# Patient Record
Sex: FEMALE | Race: OTHER | HISPANIC OR LATINO | URBAN - METROPOLITAN AREA
[De-identification: names, ages, dates, MRNs, and addresses within clinical notes are randomized per-mention and may not be internally consistent; named-entity substitution may affect disease eponyms.]

---

## 2019-07-15 ENCOUNTER — EMERGENCY (EMERGENCY)
Facility: HOSPITAL | Age: 39
LOS: 0 days | Discharge: HOME | End: 2019-07-15
Attending: EMERGENCY MEDICINE | Admitting: EMERGENCY MEDICINE
Payer: COMMERCIAL

## 2019-07-15 VITALS
OXYGEN SATURATION: 100 % | DIASTOLIC BLOOD PRESSURE: 69 MMHG | SYSTOLIC BLOOD PRESSURE: 108 MMHG | TEMPERATURE: 98 F | RESPIRATION RATE: 18 BRPM | HEART RATE: 63 BPM

## 2019-07-15 VITALS — WEIGHT: 156.97 LBS | HEIGHT: 68 IN

## 2019-07-15 DIAGNOSIS — Z79.01 LONG TERM (CURRENT) USE OF ANTICOAGULANTS: ICD-10-CM

## 2019-07-15 DIAGNOSIS — Z86.718 PERSONAL HISTORY OF OTHER VENOUS THROMBOSIS AND EMBOLISM: ICD-10-CM

## 2019-07-15 DIAGNOSIS — M54.10 RADICULOPATHY, SITE UNSPECIFIED: ICD-10-CM

## 2019-07-15 DIAGNOSIS — R07.9 CHEST PAIN, UNSPECIFIED: ICD-10-CM

## 2019-07-15 LAB
ALBUMIN SERPL ELPH-MCNC: 3.5 G/DL — SIGNIFICANT CHANGE UP (ref 3.3–5)
ALP SERPL-CCNC: 75 U/L — SIGNIFICANT CHANGE UP (ref 40–120)
ALT FLD-CCNC: 16 U/L — SIGNIFICANT CHANGE UP (ref 12–78)
ANION GAP SERPL CALC-SCNC: 8 MMOL/L — SIGNIFICANT CHANGE UP (ref 5–17)
APTT BLD: 30.6 SEC — SIGNIFICANT CHANGE UP (ref 27.5–36.3)
AST SERPL-CCNC: 9 U/L — LOW (ref 15–37)
BILIRUB SERPL-MCNC: 0.3 MG/DL — SIGNIFICANT CHANGE UP (ref 0.2–1.2)
BUN SERPL-MCNC: 9 MG/DL — SIGNIFICANT CHANGE UP (ref 7–23)
CALCIUM SERPL-MCNC: 8.8 MG/DL — SIGNIFICANT CHANGE UP (ref 8.5–10.1)
CHLORIDE SERPL-SCNC: 105 MMOL/L — SIGNIFICANT CHANGE UP (ref 96–108)
CO2 SERPL-SCNC: 27 MMOL/L — SIGNIFICANT CHANGE UP (ref 22–31)
CREAT SERPL-MCNC: 0.73 MG/DL — SIGNIFICANT CHANGE UP (ref 0.5–1.3)
D DIMER BLD IA.RAPID-MCNC: 227 NG/ML DDU — SIGNIFICANT CHANGE UP
GLUCOSE SERPL-MCNC: 72 MG/DL — SIGNIFICANT CHANGE UP (ref 70–99)
HCT VFR BLD CALC: 35.6 % — SIGNIFICANT CHANGE UP (ref 34.5–45)
HGB BLD-MCNC: 11.7 G/DL — SIGNIFICANT CHANGE UP (ref 11.5–15.5)
INR BLD: 1.08 RATIO — SIGNIFICANT CHANGE UP (ref 0.88–1.16)
MCHC RBC-ENTMCNC: 27.3 PG — SIGNIFICANT CHANGE UP (ref 27–34)
MCHC RBC-ENTMCNC: 32.9 GM/DL — SIGNIFICANT CHANGE UP (ref 32–36)
MCV RBC AUTO: 83 FL — SIGNIFICANT CHANGE UP (ref 80–100)
PLATELET # BLD AUTO: 354 K/UL — SIGNIFICANT CHANGE UP (ref 150–400)
POTASSIUM SERPL-MCNC: 3.6 MMOL/L — SIGNIFICANT CHANGE UP (ref 3.5–5.3)
POTASSIUM SERPL-SCNC: 3.6 MMOL/L — SIGNIFICANT CHANGE UP (ref 3.5–5.3)
PROT SERPL-MCNC: 7.8 GM/DL — SIGNIFICANT CHANGE UP (ref 6–8.3)
PROTHROM AB SERPL-ACNC: 12 SEC — SIGNIFICANT CHANGE UP (ref 10–12.9)
RBC # BLD: 4.29 M/UL — SIGNIFICANT CHANGE UP (ref 3.8–5.2)
RBC # FLD: 12.4 % — SIGNIFICANT CHANGE UP (ref 10.3–14.5)
SODIUM SERPL-SCNC: 140 MMOL/L — SIGNIFICANT CHANGE UP (ref 135–145)
TROPONIN I SERPL-MCNC: <0.015 NG/ML — SIGNIFICANT CHANGE UP (ref 0.01–0.04)
WBC # BLD: 3.03 K/UL — LOW (ref 3.8–10.5)
WBC # FLD AUTO: 3.03 K/UL — LOW (ref 3.8–10.5)

## 2019-07-15 PROCEDURE — 71275 CT ANGIOGRAPHY CHEST: CPT | Mod: 26

## 2019-07-15 PROCEDURE — 93010 ELECTROCARDIOGRAM REPORT: CPT

## 2019-07-15 PROCEDURE — 99284 EMERGENCY DEPT VISIT MOD MDM: CPT

## 2019-07-15 RX ORDER — SODIUM CHLORIDE 9 MG/ML
3 INJECTION INTRAMUSCULAR; INTRAVENOUS; SUBCUTANEOUS ONCE
Refills: 0 | Status: COMPLETED | OUTPATIENT
Start: 2019-07-15 | End: 2019-07-15

## 2019-07-15 RX ORDER — ACETAMINOPHEN 500 MG
650 TABLET ORAL ONCE
Refills: 0 | Status: COMPLETED | OUTPATIENT
Start: 2019-07-15 | End: 2019-07-15

## 2019-07-15 RX ORDER — KETOROLAC TROMETHAMINE 30 MG/ML
15 SYRINGE (ML) INJECTION ONCE
Refills: 0 | Status: DISCONTINUED | OUTPATIENT
Start: 2019-07-15 | End: 2019-07-15

## 2019-07-15 RX ADMIN — Medication 650 MILLIGRAM(S): at 15:52

## 2019-07-15 RX ADMIN — SODIUM CHLORIDE 3 MILLILITER(S): 9 INJECTION INTRAMUSCULAR; INTRAVENOUS; SUBCUTANEOUS at 13:36

## 2019-07-15 NOTE — ED PROVIDER NOTE - NSFOLLOWUPINSTRUCTIONS_ED_ALL_ED_FT
Activity as tolerated  Follow-up with your regular physician and/or a spine specialist. See Dr. Cho's information above.  Return with any persistent/worsening symptoms.

## 2019-07-15 NOTE — ED PROVIDER NOTE - CARDIAC, MLM
Normal rate, regular rhythm.  Heart sounds S1, S2.  No murmurs, rubs or gallops. +reproducible R sided CP with deep inspiration and palpation

## 2019-07-15 NOTE — ED PROVIDER NOTE - MUSCULOSKELETAL, MLM
Spine appears normal, range of motion is not limited, no muscle or joint tenderness. RLE no swelling, normal strength.

## 2019-07-15 NOTE — ED PROVIDER NOTE - PROGRESS NOTE DETAILS
CTA, trop all negative.  Pain seems very radicular (neck->chest and right upper ext). Worse with movement.  Refused NSAIDs.  No suggestion of cardiac or pulmonary process.  Will refer spine for ?radiculopathy.

## 2019-07-15 NOTE — ED PROVIDER NOTE - OBJECTIVE STATEMENT
40 y/o F with a PMHx of DVT during pregnancy, high platelets presents to the ED c/o CP x4 days. States pain radiates to her right arm, is unable to lift or carry anything in her right arm. +intermittent diaphoresis and SOB. Pt saw MD Campbell today, had an EKG with t inversion in f and v3, v4 and flattening in v5 and v6. No ST elevations. MD sent pt to [facility] to r/o pericarditis vs PE vs ACS. States the pain has been constant and worse when breathing, lying down and on exertion. Denies tobacco use, leg swelling, cough, or fever. NKDA. PMD: Dr. Rodriguez.

## 2019-07-15 NOTE — ED PROVIDER NOTE - CARE PROVIDER_API CALL
Narciso Cho (DO)  Orthopaedic Surgery  [address] [place] [address], [address] [place] [address]  Phone: [phone]  Fax: [phone]  Follow Up Time:

## 2019-07-15 NOTE — ED ADULT NURSE NOTE - NSIMPLEMENTINTERV_GEN_ALL_ED
Implemented All Universal Safety Interventions:  [place] to call system. Call bell, personal items and telephone within reach. Instruct patient to call for assistance. Room bathroom lighting operational. Non-slip footwear when patient is off stretcher. Physically safe environment: no spills, clutter or unnecessary equipment. Stretcher in lowest position, wheels locked, appropriate side rails in place.

## 2019-07-19 ENCOUNTER — APPOINTMENT (OUTPATIENT)
Dept: NEUROSURGERY | Facility: CLINIC | Age: 39
End: 2019-07-19

## 2019-08-03 PROBLEM — O22.30 DEEP PHLEBOTHROMBOSIS IN PREGNANCY, UNSPECIFIED TRIMESTER: Chronic | Status: ACTIVE | Noted: 2019-07-15

## 2019-08-06 ENCOUNTER — APPOINTMENT (OUTPATIENT)
Dept: NEUROLOGY | Facility: CLINIC | Age: 39
End: 2019-08-06
Payer: COMMERCIAL

## 2019-08-06 VITALS
SYSTOLIC BLOOD PRESSURE: 102 MMHG | BODY MASS INDEX: 24.25 KG/M2 | HEART RATE: 94 BPM | HEIGHT: 68 IN | WEIGHT: 160 LBS | DIASTOLIC BLOOD PRESSURE: 66 MMHG

## 2019-08-06 DIAGNOSIS — R56.9 UNSPECIFIED CONVULSIONS: ICD-10-CM

## 2019-08-06 DIAGNOSIS — Z80.9 FAMILY HISTORY OF MALIGNANT NEOPLASM, UNSPECIFIED: ICD-10-CM

## 2019-08-06 DIAGNOSIS — G43.709 CHRONIC MIGRAINE W/OUT AURA, NOT INTRACTABLE, W/OUT STATUS MIGRAINOSUS: ICD-10-CM

## 2019-08-06 DIAGNOSIS — Z78.9 OTHER SPECIFIED HEALTH STATUS: ICD-10-CM

## 2019-08-06 DIAGNOSIS — Z86.718 PERSONAL HISTORY OF OTHER VENOUS THROMBOSIS AND EMBOLISM: ICD-10-CM

## 2019-08-06 DIAGNOSIS — D47.3 ESSENTIAL (HEMORRHAGIC) THROMBOCYTHEMIA: ICD-10-CM

## 2019-08-06 PROCEDURE — 99244 OFF/OP CNSLTJ NEW/EST MOD 40: CPT

## 2019-08-06 NOTE — CONSULT LETTER
[Dear  ___] : Dear  [unfilled], [Consult Letter:] : I had the pleasure of evaluating your patient, [unfilled]. [Consult Closing:] : Thank you very much for allowing me to participate in the care of this patient.  If you have any questions, please do not hesitate to contact me. [Please see my note below.] : Please see my note below. [FreeTextEntry2] : Nathaniel Mancuso [FreeTextEntry3] : Sincerely,\par \par \par Roxana Tucker MD\par Diplomate, American Academy of Psychiatry and Neurology\par Board Certified in the Subspecialty of Clinical Neurophysiology\par Board Certified in the Subspecialty of Sleep Medicine\par Board Certified in the Subspecialty of Epilepsy\par  [DrGene  ___] : Dr. TOLEDO [DrGene ___] : Dr. TOLEDO

## 2019-08-06 NOTE — DATA REVIEWED
[de-identified] : MRi brain with and without contrast 7/23/19: 1. NO acute infarct or acute intracranial disease\par 2. 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 Although much les likely, hemorrhagic neoplasm may present with similar characteristics.  [de-identified] : CT brain 7/22/19: 1.3 cm poorly defined hypodense region in the left occipital lobe. May represent hemorrhagic contusion, AVM subarachnoid hemorrhage or intracranial mass. \par CTA brain 7/22/19: Negative\par CTA neck 7/22/19: negative\par \par CT chest/abd/pelvis without contrast 7/22/19: unremarkable. No evidence of acute traumatic injury.

## 2019-08-06 NOTE — HISTORY OF PRESENT ILLNESS
[FreeTextEntry1] : On 7/22/19 she was having a breast biopsy. Immediately after the biopsy she was having a mammogram. She has had several previous biopsies. She was not anxious. She does not recall having any preceding symptoms. The last thing she recalls she was being set up for an image and the next thing she knew there was screaming and then she was being brought into the emergency room at [facility].\par She is not sure how long she was unconscious for. She was told by one of the nurses that while on the floor she was shaking. There was no urinary incontinence or tongue bite. She regained consciousness in the mammogram suite and was surrounded by several people. However, she thinks she lost consciousness again because the next thing she knew she was in the ER. She does recall being awake but not being able to speak. \par She thinks that an hour elapsed from the time when she first passed out in the mammo suite until she was back to her baseline. \par \par She was given local anesthesia for the biopsy but no other medications. 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 \par \par She had CT head/CTA head/CTA neck and MRI brain. She was told that she had a cavernoma.\par \par The previous week she was sent to the hospital by Dr. Abebe for an abnormal EKG. \par She was seen in the ED at [facility] with chest pain (radiating to the right arm) and shortness of breath. She also had an abnormal EEG earlier in the day at her doctor's office. She had a CT angio of the chest to rule out pulmonary embolism. It was negative. \par \par She has no prior episodes of loss of consciousness in the past.\par However, she did once wake up from surgery and was able to hear and see but was unable to speak. This occurred after a surgery in 2016. She was unable to move any of her extremities. She believes that there was at least a few hours between her waking up and being able to move and speak.\par \par She has no previous history of seizures.\par She was born full term. There were no complications with pregnancy and no history of developmental delay.\par There was no history of febrile seizures. \par She has no history of head trauma or CNS infections.\par She has a first cousin with seizures.\par She has not woken up with tongue bites, incontinence or had lapses in time that she cannot account for.\par She has no olfactory hallucinations, rising epigastric sensation or stereotypical nadine vu. \par \par She has been told that she has excessive platelets.\par She has a history of having blood clots during pregnancy.\par \par She started seeing Dr. Cho for her right arm pain and weakness. \par \par Her cardiology workup was put on hold but she is seeing  tomorrow and is supposed to have a Holter monitor. \par \par Her  has noticed mild snoring at night but no pauses in her breathing.

## 2019-08-06 NOTE — REVIEW OF SYSTEMS
[As Noted in HPI] : as noted in HPI [Palpitations] : palpitations [Chest Pain] : chest pain [Shortness Of Breath] : shortness of breath [Arthralgias] : arthralgias [Joint Pain] : joint pain [Negative] : Genitourinary [FreeTextEntry2] : night sweats [de-identified] : headache, double vision, difficulty speaking, weakness [FreeTextEntry5] : syncope

## 2019-08-06 NOTE — PHYSICAL EXAM
[FreeTextEntry1] : Examination:\par Constitutional: normal, no apparent distress\par oropharynx: narrow\par Eyes: normal conjunctiva b/l, no ptosis, visual fields full\par Respiratory: no respiratory distress, normal effort, normal auscultation\par Cardiovascular: normal rate, rhythm, no murmurs\par Neck: supple, no masses\par Vascular: carotids normal\par Skin: normal color, no rashes\par Psych: normal mood, affect\par \par Neurological:\par Memory: normal memory, oriented to person, place, time\par Language intact/no aphasia\par Cranial Nerves: II-XII intact, Pupils equally round and reactive to light, ocular muscles/movements intact, no ptosis, no facial weakness, tongue protrudes normally in the midline, \par Motor: normal tone, no pronator drift, full strength in all four extremities in the proximal and distal muscle groups\par Coordination: Fine motor movements intact, rapid alternating movements intact, finger to nose intact bilaterally\par Sensory: intact to light touch, vibration, joint position sense, negative Romberg examination\par DTRs: symmetric, 2+ in b/l triceps, 2+ in b/l biceps, 2+ in b/l brachioradialis, 2+ in bilateral patellars, 2+ in bilateral Achilles, Babinskis negative bilaterally\par Gait: narrow based, steady, able to walk on heels, toes, tandem gait\par \par

## 2019-08-06 NOTE — DISCUSSION/SUMMARY
[FreeTextEntry1] : Ms. Gaming is a 39 year old woman with a history of thrombocytosis, DVT and palpitations who had an episode of loss of consciousness during a mammogram which took place after a breast biopsy.\par The exact details of the event are unclear but I will try to contact the radiology department at [facility] to speak to Camilla, the mammogram technician.\par However, it does seem that the event was somewhat prolonged for simple syncope.\par A 24 hour EEG will be performed to evaluate for risk for seizures/epilepsy. I am holding off on any anticonvulsants for now. \par Agree with repeat MRI brain which is scheduled for the end of the month. \par Follow up with cardiology to include an event monitor. \par She should avoid driving at this time.\par I will follow up with her after her EEG, sooner if needed.

## 2019-08-20 ENCOUNTER — APPOINTMENT (OUTPATIENT)
Dept: NEUROLOGY | Facility: CLINIC | Age: 39
End: 2019-08-20
Payer: COMMERCIAL

## 2019-08-20 PROCEDURE — 95816 EEG AWAKE AND DROWSY: CPT

## 2019-08-21 ENCOUNTER — APPOINTMENT (OUTPATIENT)
Dept: NEUROLOGY | Facility: CLINIC | Age: 39
End: 2019-08-21
Payer: COMMERCIAL

## 2019-08-21 PROCEDURE — 95953: CPT

## 2019-08-21 PROCEDURE — 95957 EEG DIGITAL ANALYSIS: CPT

## 2019-09-18 ENCOUNTER — APPOINTMENT (OUTPATIENT)
Dept: NEUROLOGY | Facility: CLINIC | Age: 39
End: 2019-09-18
Payer: COMMERCIAL

## 2019-09-18 VITALS
HEIGHT: 68 IN | DIASTOLIC BLOOD PRESSURE: 77 MMHG | HEART RATE: 75 BPM | SYSTOLIC BLOOD PRESSURE: 116 MMHG | WEIGHT: 163 LBS | BODY MASS INDEX: 24.71 KG/M2

## 2019-09-18 DIAGNOSIS — R55 SYNCOPE AND COLLAPSE: ICD-10-CM

## 2019-09-18 DIAGNOSIS — M79.2 NEURALGIA AND NEURITIS, UNSPECIFIED: ICD-10-CM

## 2019-09-18 PROCEDURE — 99213 OFFICE O/P EST LOW 20 MIN: CPT

## 2019-09-18 NOTE — DATA REVIEWED
[de-identified] : MRi brain with and without contrast 7/23/19: 1. NO acute infarct or acute intracranial disease\par 2. 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 Although much les likely, hemorrhagic neoplasm may present with similar characteristics.  [de-identified] : EEG 8/20/19: Normal\par 24 hour ambulatory EEG 8/20/19-8/21/19: normal\par  [de-identified] : CT brain 7/22/19: 1.3 cm poorly defined hypodense region in the left occipital lobe. May represent hemorrhagic contusion, AVM subarachnoid hemorrhage or intracranial mass. \par CTA brain 7/22/19: Negative\par CTA neck 7/22/19: negative\par \par CT chest/abd/pelvis without contrast 7/22/19: unremarkable. No evidence of acute traumatic injury. \par \par MRI cervical spine on contrast 7/19/19: \par Reversal of normal cervical lordosis\par C4-C5: Disc osteophyte complex and bilateral uncovertebral hypertrophy and mild compression of ventral cord resulting in mild spinal canal stenosis. No cord signal abnormality. No significant foraminal narrowing. \par C5-6: Disc osteophyte complex and bilateral uncovertebral hypertrophy flattening the ventral cord and resulting in mild spinal canal stenosis. No cord signal abnormality. Small right foraminal disc herniation indenting the exiting right C6 nerve root.\par \par CT head 8/16/19:\par 12 mm hyperdense lesion in left occipital lobe. This could correspond to reported cavernoma.

## 2019-09-18 NOTE — CONSULT LETTER
[Dear  ___] : Dear  [unfilled], [Consult Letter:] : I had the pleasure of evaluating your patient, [unfilled]. [Please see my note below.] : Please see my note below. [FreeTextEntry2] : Nathaniel Mancuso [Consult Closing:] : Thank you very much for allowing me to participate in the care of this patient.  If you have any questions, please do not hesitate to contact me. [FreeTextEntry3] : Sincerely,\par \par \par Roxana Tucker MD\par Diplomate, American Academy of Psychiatry and Neurology\par Board Certified in the Subspecialty of Clinical Neurophysiology\par Board Certified in the Subspecialty of Sleep Medicine\par Board Certified in the Subspecialty of Epilepsy\par

## 2019-09-18 NOTE — DISCUSSION/SUMMARY
[FreeTextEntry1] : Ms. Gaming is a 39 year old woman with a history of thrombocytosis, DVT and palpitations who had an episode of loss of consciousness during a mammogram which took place after a breast biopsy.\par \par -Episode of loss of consciousness - Normal routine EEG and 24 hour ambulatory EEG.\par Reportedly + tilt table test. \par These findings make syncope less likely. \par Increase PO salt intake\par Normotensive today.\par Will not start any anticonvulsants at this time.\par \par -Right arm pain - ? radiculopathy. MRI shows indentation of right C6 nerve root.\par Will get EMG to evaluate for radiculopathy vs mononeuropathy.\par \par Follow-up after EMG.\par

## 2019-09-18 NOTE — PHYSICAL EXAM
[FreeTextEntry1] : Examination:\par Constitutional: normal, no apparent distress\par Eyes: normal conjunctiva b/l, no ptosis, visual fields full\par Respiratory: no respiratory distress, normal effort, normal auscultation\par Cardiovascular: normal rate, rhythm, no murmurs\par Neck: supple, no masses\par Vascular: carotids normal\par Skin: normal color, no rashes\par Psych: normal mood, affect\par \par Neurological:\par Memory: normal memory, oriented to person, place, time\par Language intact/no aphasia\par Cranial Nerves: II-XII intact, Pupils equally round and reactive to light, ocular muscles/movements intact, no ptosis, no facial weakness, tongue protrudes normally in the midline, \par Motor: normal tone, no pronator drift, full strength in all four extremities in the proximal and distal muscle groups\par Coordination: Fine motor movements intact, rapid alternating movements intact, finger to nose intact bilaterally\par Sensory: intact to light touch\par DTRs: symmetric, 1/4 in b/l triceps, 2/4 in b/l biceps, radialis, 2/4 in b/l patellars. negative Yost's bilaterally\par Gait: narrow based, steady\par

## 2019-09-18 NOTE — HISTORY OF PRESENT ILLNESS
[FreeTextEntry1] : I last saw Ms. Gaming on 8/6/19.\par She has not had any episodes of loss of consciousness since her last visit.\par  She denies having any seizures since the last visit. There have been no episodes of waking up with tongue bites, urinary incontinence, or unexplained muscle soreness. There have been no staring spells, lapses in time, myoclonus, episodes of intense nadine vu or rising, olfactory hallucinations or epigastric sensations.\par \par She reports that she "failed" her tilt table test within 5 minutes and had a SBP in the high 70s. She was told to increase salt intake. \par \par She was referred to a hand surgeon by Dr. Mancuso. She saw Dr. Desai. he would like her to get a nerve conduction study.\par She has constant pain in her right arm from the mid upper arm to the mid forearm. Her activity is limited due to the pain. She cannot lift a light object without having pain.\par She has occasional numbness/tingling in all five fingers.\par She is woken up from sleep from the pain.\par She does not have neck pain.\par She describes the pain as sharp. It has been present for about 10 months.\par She has had MRI of her cervical spine.

## 2019-10-23 ENCOUNTER — APPOINTMENT (OUTPATIENT)
Dept: NEUROLOGY | Facility: CLINIC | Age: 39
End: 2019-10-23
Payer: COMMERCIAL

## 2019-10-23 PROCEDURE — 95909 NRV CNDJ TST 5-6 STUDIES: CPT

## 2019-10-23 PROCEDURE — 95886 MUSC TEST DONE W/N TEST COMP: CPT

## 2019-10-23 NOTE — PHYSICAL EXAM
[General Appearance - Alert] : alert [General Appearance - In No Acute Distress] : in no acute distress [Person] : oriented to person [Place] : oriented to place [Time] : oriented to time [Fluency] : fluency intact [Comprehension] : comprehension intact [Past History] : adequate knowledge of personal past history [Cranial Nerves Optic (II)] : visual acuity intact bilaterally,  visual fields full to confrontation, pupils equal round and reactive to light [Cranial Nerves Trigeminal (V)] : facial sensation intact symmetrically [Cranial Nerves Facial (VII)] : face symmetrical [Cranial Nerves Glossopharyngeal (IX)] : tongue and palate midline [Cranial Nerves Vestibulocochlear (VIII)] : hearing was intact bilaterally [Cranial Nerves Accessory (XI - Cranial And Spinal)] : head turning and shoulder shrug symmetric [Cranial Nerves Hypoglossal (XII)] : there was no tongue deviation with protrusion [Motor Tone] : muscle tone was normal in all four extremities [Motor Strength] : muscle strength was normal in all four extremities [Motor Handedness Right-Handed] : the patient is right hand dominant [Abnormal Walk] : normal gait [Sensation Tactile Decrease] : light touch was intact [Limited Balance] : balance was intact [Tremor] : no tremor present [2+] : Patella left 2+

## 2019-10-23 NOTE — ASSESSMENT
[FreeTextEntry1] : 39-year-old woman complaining of one year old, right arm pain, no electrophysiologic evidence of a peripheral neuropathy, or cervical radiculopathy at this time.\par Advice to followup with her orthopedist.

## 2019-10-23 NOTE — PROCEDURE
[FreeTextEntry1] : Electromyography and nerve conduction study of the right upper extremity is normal, no evidence of a cervical radiculopathy or entrapment neuropathy noted.

## 2019-10-23 NOTE — HISTORY OF PRESENT ILLNESS
[FreeTextEntry1] : 39-year-old woman right-handed, complaining of right elbow, upper arm pain or the last year. Denies radiating neck pain to the arm.

## 2021-07-10 NOTE — ED ADULT NURSE NOTE - MUSCULOSKELETAL WDL
Full range of motion of upper and lower extremities, no joint tenderness/swelling. [id number] Comprehensive

## 2021-08-13 ENCOUNTER — APPOINTMENT (OUTPATIENT)
Age: 41
End: 2021-08-13
Payer: COMMERCIAL

## 2021-08-13 PROCEDURE — 0001A: CPT

## 2021-09-03 ENCOUNTER — APPOINTMENT (OUTPATIENT)
Age: 41
End: 2021-09-03
Payer: COMMERCIAL

## 2021-09-03 PROCEDURE — 0002A: CPT

## 2021-11-18 ENCOUNTER — TRANSCRIPTION ENCOUNTER (OUTPATIENT)
Age: 41
End: 2021-11-18

## 2021-11-21 ENCOUNTER — TRANSCRIPTION ENCOUNTER (OUTPATIENT)
Age: 41
End: 2021-11-21

## 2021-11-27 ENCOUNTER — TRANSCRIPTION ENCOUNTER (OUTPATIENT)
Age: 41
End: 2021-11-27

## 2022-01-04 ENCOUNTER — TRANSCRIPTION ENCOUNTER (OUTPATIENT)
Age: 42
End: 2022-01-04

## 2023-02-07 ENCOUNTER — NON-APPOINTMENT (OUTPATIENT)
Age: 43
End: 2023-02-07

## 2023-02-07 ENCOUNTER — APPOINTMENT (OUTPATIENT)
Dept: ORTHOPEDIC SURGERY | Facility: CLINIC | Age: 43
End: 2023-02-07
Payer: COMMERCIAL

## 2023-02-07 PROCEDURE — 73030 X-RAY EXAM OF SHOULDER: CPT | Mod: RT

## 2023-02-07 PROCEDURE — 99204 OFFICE O/P NEW MOD 45 MIN: CPT

## 2023-02-07 NOTE — DISCUSSION/SUMMARY
[de-identified] : I had a lengthy discussion with the patient regarding their current condition. We discussed the treatment options including operative and nonoperative management. At this time I recommended conservative management.  She will discuss with her neurologist if she is eligible for a cortisone injection.  She sees them next week.  In the interim she will continue with formal physical therapy with Phi Olguin.  She will follow-up with us in 4 to 6 weeks for repeat evaluation and potential injection if cleared.  All questions were answered.

## 2023-02-07 NOTE — HISTORY OF PRESENT ILLNESS
[Worsening] : worsening [___ mths] : [unfilled] month(s) ago [3] : a current pain level of 3/10 [4] : an average pain level of 4/10 [2] : a minimum pain level of 2/10 [5] : a maximum pain level of 5/10 [Direct Pressure] : worsened by direct pressure [Lifting] : worsened by lifting [de-identified] : DONNY VILLAFUERTE is a 42 year female being seen for initial visit R shoulder pain. She has been experiencing R shoulder pain since Aug 2022. She was seeing Phi Olguin, PT, for her neck (h/x of disc herniations), who rec'd she see Dr. Trujillo for R shoulder pain. She reports she cannot raise her R arm above 90 degrees due to pain. Patient reports no specific LISA or acute trauma to joint. She endorses numbness and tingling to extremity due to pain. She has been performing PT with minimal relief. She notes pain when trying to sleep at night.  She informs me due to her neck and brain issues she has been told NSAIDs are contraindicated.  She can only take Tylenol

## 2023-02-07 NOTE — PHYSICAL EXAM
[de-identified] : Physical Exam: \par General: Well appearing, no acute distress \par Neurologic: A&Ox3, No focal deficits \par Head: NCAT without abrasions, lacerations, or ecchymosis to head, face, or scalp \par Eyes: No scleral icterus, no gross abnormalities \par Respiratory: Equal chest wall expansion bilaterally, no accessory muscle use \par Lymphatic: No lymphadenopathy palpated \par Skin: Warm and dry \par Psychiatric: Normal mood and affect\par \par Examination of the Right shoulder shows no obvious deformity, swelling or erythema. Mild tenderness to palpation over the anterior shoulder. No AC joint tenderness. The patient demonstrates active/passive ROM of Forward Flexion to 145 degrees, External Rotation to 40 degrees and Internal Rotation to a mid lumbar level. The patient has a positive Perez and Neers test. No pain with cross body adduction, lift off testing, AC compression testing or Yergason testing. The patient has 4/5 strength to forward flexion with pronation, internal and external rotation. Compartments are soft and nontender. The patient has 2+ cap refill and sensation is intact in the hand. \par \par Left shoulder shows no deformity. No tenderness to palpation over the biceps or AC joint. The patient has Forward Flexion to 170 degrees, External Rotation to 45 degrees and Internal Rotation to a mid lumbar level. 5/5 strength to forward flexion with pronation, internal and external rotation. Compartments are soft and nontender. 2+ cap refill. Sensation intact distally.\par  [de-identified] : 4 views of R shoulder were performed today and available for me to review. Results were discussed with the patient. They demonstrate a calcification adjacent to the greater tuberosity consistent with calcific tendinitis.

## 2023-02-07 NOTE — REVIEW OF SYSTEMS
[Joint Pain] : joint pain [Joint Stiffness] : joint stiffness [Negative] : Heme/Lymph [FreeTextEntry9] : R shoulder

## 2023-02-07 NOTE — ADDENDUM
[FreeTextEntry1] : Documented by Katherine Harmon acting as a scribe for Dr. Trujillo and Clement Guy PA-C on 02/07/2023.   All medical record entries made by the Scribe were at my, Dr. Trujillo, and Clement Guy's, direction and personally dictated by me on 02/07/2023. I have reviewed the chart and agree that the record accurately reflects my personal performance of the history, physical exam, procedure and imaging.

## 2023-04-25 ENCOUNTER — APPOINTMENT (OUTPATIENT)
Dept: ORTHOPEDIC SURGERY | Facility: CLINIC | Age: 43
End: 2023-04-25
Payer: COMMERCIAL

## 2023-04-25 DIAGNOSIS — M75.21 BICIPITAL TENDINITIS, RIGHT SHOULDER: ICD-10-CM

## 2023-04-25 DIAGNOSIS — M75.31 CALCIFIC TENDINITIS OF RIGHT SHOULDER: ICD-10-CM

## 2023-04-25 PROCEDURE — 99213 OFFICE O/P EST LOW 20 MIN: CPT

## 2023-04-25 NOTE — DISCUSSION/SUMMARY
[de-identified] : We had a thorough discussion regarding the nature of her pain, the pathophysiology, as well as all treatment options. Patient is doing well at this time. I do believe she would benefit from some more PT. Patient was given prescription of formal physical therapy that she will perform 2x/wk for 6-8 wks. If symptoms persist or worsen she should follow up for repeat clinical assessment. All questions were answered and the patient verbalized understanding. The patient is in agreement with this treatment plan.

## 2023-04-25 NOTE — PHYSICAL EXAM
[de-identified] : Physical Exam: \par General: Well appearing, no acute distress \par Neurologic: A&Ox3, No focal deficits \par Head: NCAT without abrasions, lacerations, or ecchymosis to head, face, or scalp \par Eyes: No scleral icterus, no gross abnormalities \par Respiratory: Equal chest wall expansion bilaterally, no accessory muscle use \par Lymphatic: No lymphadenopathy palpated \par Skin: Warm and dry \par Psychiatric: Normal mood and affect\par \par Examination of the Right shoulder shows no obvious deformity, swelling or erythema. Mild tenderness to palpation over the anterior shoulder. No AC joint tenderness. The patient demonstrates active/passive ROM of Forward Flexion to 145 degrees, External Rotation to 45 degrees and Internal Rotation to a mid lumbar level. The patient has a positive Perez and Neers test. No pain with cross body adduction, lift off testing, AC compression testing or Yergason testing. The patient has 4/5 strength to forward flexion with pronation, internal and external rotation. Compartments are soft and nontender. The patient has 2+ cap refill and sensation is intact in the hand. \par \par Left shoulder shows no deformity. No tenderness to palpation over the biceps or AC joint. The patient has Forward Flexion to 170 degrees, External Rotation to 45 degrees and Internal Rotation to a mid lumbar level. 5/5 strength to forward flexion with pronation, internal and external rotation. Compartments are soft and nontender. 2+ cap refill. Sensation intact distally.\par

## 2023-04-25 NOTE — HISTORY OF PRESENT ILLNESS
[de-identified] : DONNY is a 42 year female here today for follow up visit for right shoulder pain. She has been doing PT and notes improvements in function and pain relief since her last visit. She is happy with her progress so far. 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

## 2023-04-25 NOTE — ADDENDUM
[FreeTextEntry1] : Documented by Katherine Harmon acting as a scribe for Dr. Trujillo and Clement Guy PA-C on 04/25/2023.   All medical record entries made by the Scribe were at my, Dr. Trujillo, and Clement Guy's, direction and personally dictated by me on 04/25/2023. I have reviewed the chart and agree that the record accurately reflects my personal performance of the history, physical exam, procedure and imaging.

## 2023-05-25 ENCOUNTER — APPOINTMENT (OUTPATIENT)
Dept: MAMMOGRAPHY | Facility: CLINIC | Age: 43
End: 2023-05-25
Payer: COMMERCIAL

## 2023-05-25 ENCOUNTER — OUTPATIENT (OUTPATIENT)
Dept: OUTPATIENT SERVICES | Facility: HOSPITAL | Age: 43
LOS: 1 days | End: 2023-05-25
Payer: COMMERCIAL

## 2023-05-25 ENCOUNTER — APPOINTMENT (OUTPATIENT)
Dept: MRI IMAGING | Facility: CLINIC | Age: 43
End: 2023-05-25
Payer: COMMERCIAL

## 2023-05-25 DIAGNOSIS — R92.8 OTHER ABNORMAL AND INCONCLUSIVE FINDINGS ON DIAGNOSTIC IMAGING OF BREAST: ICD-10-CM

## 2023-05-25 PROCEDURE — G0279: CPT | Mod: 26

## 2023-05-25 PROCEDURE — 77049 MRI BREAST C-+ W/CAD BI: CPT | Mod: 26

## 2023-05-25 PROCEDURE — 77066 DX MAMMO INCL CAD BI: CPT

## 2023-05-25 PROCEDURE — 76642 ULTRASOUND BREAST LIMITED: CPT | Mod: 26,50

## 2023-05-25 PROCEDURE — G0279: CPT

## 2023-05-25 PROCEDURE — 77066 DX MAMMO INCL CAD BI: CPT | Mod: 26

## 2023-05-25 PROCEDURE — C8937: CPT

## 2023-05-25 PROCEDURE — C8908: CPT

## 2023-05-25 PROCEDURE — 76642 ULTRASOUND BREAST LIMITED: CPT

## 2024-01-15 ENCOUNTER — APPOINTMENT (OUTPATIENT)
Dept: ORTHOPEDIC SURGERY | Facility: CLINIC | Age: 44
End: 2024-01-15
Payer: COMMERCIAL

## 2024-01-15 VITALS — HEIGHT: 68 IN | WEIGHT: 165 LBS | BODY MASS INDEX: 25.01 KG/M2

## 2024-01-15 DIAGNOSIS — M77.12 LATERAL EPICONDYLITIS, LEFT ELBOW: ICD-10-CM

## 2024-01-15 PROCEDURE — 99214 OFFICE O/P EST MOD 30 MIN: CPT

## 2024-01-15 PROCEDURE — 73080 X-RAY EXAM OF ELBOW: CPT | Mod: 26,LT

## 2024-01-15 NOTE — ADDENDUM
[FreeTextEntry1] : Documented by Linda Cano acting as a scribe for DAVID Guy on 01/15/2024. All medical record entries made by the Scribe were at my, DAVID Guy's, direction and personally dictated by me on 01/15/2024. I have reviewed the chart and agree that the record accurately reflects my personal performance of the history, physical exam, procedure and imaging.

## 2024-01-15 NOTE — HISTORY OF PRESENT ILLNESS
[de-identified] : DONNY is a 42 year female here today for left elbow pain. She reports 3 months of lateral elbow pain. Her pain radiates down her forearm. She denies any specific LISA. She denies numbness/ tingling. She is a  in [place].  She localizes it laterally.

## 2024-01-15 NOTE — REVIEW OF SYSTEMS
[Joint Pain] : joint pain [Joint Stiffness] : joint stiffness [Negative] : Heme/Lymph [FreeTextEntry9] : L elbow

## 2024-01-15 NOTE — PHYSICAL EXAM
[de-identified] : Physical Exam:  General: Well appearing, no acute distress  Neurologic: A&Ox3, No focal deficits  Head: NCAT without abrasions, lacerations, or ecchymosis to head, face, or scalp  Eyes: No scleral icterus, no gross abnormalities  Respiratory: Equal chest wall expansion bilaterally, no accessory muscle use  Lymphatic: No lymphadenopathy palpated  Skin: Warm and dry  Psychiatric: Normal mood and affect  Left elbow shows no obvious deformity swelling or ecchymosis. There is tenderness to palpation over the radial head. No tenderness over the medial epicondyle. Elbow ROM 0-130 with pain. Full ROM with pronation and supination with pain. Pain upon resisted elbow supination. No instability to valgus or vaurs stress in the elbow. Pain with resisted wrist extension with simultaneous elbow extension. Negative Tinel sign at the cubital tunnel and carpal tunnel. Compartments soft and nontender. Sensation intact distally. 2+ cap refill.  No tenderness to palpation of the distal radius and ulna or the left proximal humerus. [de-identified] : The following radiographs were ordered and read by me during this patient's visit. I reviewed each radiograph in detail with the patient and discussed the findings as highlighted below. 3 views of the left elbow were obtained today that show no fracture, dislocation. There is no degenerative change seen. There is no malalignment. No obvious osseous abnormality. Otherwise unremarkable.

## 2024-01-15 NOTE — DISCUSSION/SUMMARY
[de-identified] : We had a thorough discussion regarding the nature of her pain, the pathophysiology, as well as all treatment options. Based on clinical exam and radiograph findings they have left lateral epicondylitis. The patient should wear an elbow strap during the day and apply Voltaren gel to the affected area. The patient was given a wrist brace in the office today. Patient was given prescription of formal physical therapy that she will perform 2x/wk for 6-8 wks. She should ice the area as needed. We discussed PRP vs. cortisone injection vs. anti-inflammatory medication. She is looking to proceed with conservative treatment for now. All questions were answered and the patient verbalized understanding. The patient is in agreement with this treatment plan.

## 2024-02-04 ENCOUNTER — NON-APPOINTMENT (OUTPATIENT)
Age: 44
End: 2024-02-04

## 2024-02-16 ENCOUNTER — RX RENEWAL (OUTPATIENT)
Age: 44
End: 2024-02-16

## 2024-02-16 RX ORDER — DICLOFENAC SODIUM 1% 10 MG/G
1 GEL TOPICAL
Qty: 100 | Refills: 0 | Status: ACTIVE | COMMUNITY
Start: 2024-01-15 | End: 1900-01-01

## 2024-02-20 NOTE — ED STATDOCS - PROGRESS NOTE DETAILS
Rosa Maria NP for Dr. Aguiar: 38 y/o female with a PMHx of DVT during pregnancy presents to the ED c/o chest pain x4 days. States pain radiates to her right arm, is unable to lift or carry anything in her right arm. +intermittent diaphoresis and SOB. Saw Dr. Campbell today, had an EKG with t inversion in f and v3, v4 and flattening in v5 and v6. No ST elevations. Sent the pt to  to r/o pericarditis vs PE vs ACS. States the pain has been constant, pain worse when breathing, laying down or on exertion. Nonsmoker. not applicable

## 2024-10-07 ENCOUNTER — APPOINTMENT (OUTPATIENT)
Dept: ORTHOPEDIC SURGERY | Facility: CLINIC | Age: 44
End: 2024-10-07
Payer: COMMERCIAL

## 2024-10-07 VITALS — BODY MASS INDEX: 26.52 KG/M2 | WEIGHT: 175 LBS | HEIGHT: 68 IN

## 2024-10-07 DIAGNOSIS — M75.31 CALCIFIC TENDINITIS OF RIGHT SHOULDER: ICD-10-CM

## 2024-10-07 PROCEDURE — 99214 OFFICE O/P EST MOD 30 MIN: CPT

## 2024-10-07 PROCEDURE — 73030 X-RAY EXAM OF SHOULDER: CPT | Mod: RT

## 2025-01-21 ENCOUNTER — NON-APPOINTMENT (OUTPATIENT)
Age: 45
End: 2025-01-21